# Patient Record
(demographics unavailable — no encounter records)

---

## 2025-04-09 NOTE — ASSESSMENT
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  No nodules or other lesions noted.  Considering this is unclear what significance of these neuroendocrine cells represents.  Patient has already undergone an endoscopic ultrasound with no lesions noted and with negative biopsies and a CT abdomen pelvis with no malignancy found.  Considering it has been 6 months since her initial endoscopy we will plan to repeat the endoscopy to assess for any lesions and see biopsy even normal-appearing small bowel.  Patient is asymptomatic at this time and has no family history of malignancy.

## 2025-04-09 NOTE — HISTORY OF PRESENT ILLNESS
[FreeTextEntry1] : None [de-identified] : January 2025: No abnormalities.  Biopsies negative for malignancy [de-identified] : 2025: No malignancies identified

## 2025-04-09 NOTE — PHYSICAL EXAM

## 2025-06-02 NOTE — PHYSICAL EXAM
[Alert] : alert [Normal Voice/Communication] : normal voice/communication [Healthy Appearing] : healthy appearing [No Acute Distress] : no acute distress [Sclera] : the sclera and conjunctiva were normal [Hearing Threshold Finger Rub Not [place]] : hearing was normal [Normal Lips/Gums] : the lips and gums were normal [Oropharynx] : the oropharynx was normal [Normal Appearance] : the appearance of the neck was normal [No Neck Mass] : no neck mass was observed [No Respiratory Distress] : no respiratory distress [No Acc Muscle Use] : no accessory muscle use [Respiration, Rhythm And Depth] : normal respiratory rhythm and effort [Auscultation Breath Sounds / Voice Sounds] : lungs were clear to auscultation bilaterally [Heart Rate And Rhythm] : heart rate was normal and rhythm regular [Normal S1, S2] : normal S1 and S2 [Murmurs] : no murmurs [Bowel Sounds] : normal bowel sounds [Abdomen Tenderness] : non-tender [No Masses] : no abdominal mass palpated [Abdomen Soft] : soft [] : no hepatosplenomegaly [Oriented To Time, Place, And Person] : oriented to person, place, and time